# Patient Record
Sex: MALE | URBAN - METROPOLITAN AREA
[De-identification: names, ages, dates, MRNs, and addresses within clinical notes are randomized per-mention and may not be internally consistent; named-entity substitution may affect disease eponyms.]

---

## 2021-08-16 ENCOUNTER — OFFICE VISIT (OUTPATIENT)
Dept: URBAN - METROPOLITAN AREA CLINIC 64 | Facility: CLINIC | Age: 31
End: 2021-08-16

## 2021-08-16 PROCEDURE — 99203 OFFICE O/P NEW LOW 30 MIN: CPT | Performed by: OPTOMETRIST

## 2021-08-16 RX ORDER — CYCLOPENTOLATE HYDROCHLORIDE 10 MG/ML
1 % SOLUTION/ DROPS OPHTHALMIC
Qty: 1 | Refills: 0 | Status: INACTIVE
Start: 2021-08-16 | End: 2021-08-16

## 2021-08-16 RX ORDER — PREDNISOLONE ACETATE 10 MG/ML
1 % SUSPENSION/ DROPS OPHTHALMIC
Qty: 1 | Refills: 1 | Status: INACTIVE
Start: 2021-08-16 | End: 2021-08-27

## 2021-08-16 ASSESSMENT — INTRAOCULAR PRESSURE
OD: 26
OS: 16

## 2021-08-16 NOTE — IMPRESSION/PLAN
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 Plan: Post traumatic irits OD after blunt trauma with a toy spring. Start pred acetate q2h and 1% cyclo TID OD. RTC in 2-3 days for follow up. Slightly elevated IOP OD. Cataract is developing OD. Unable to view posterior pole due to severe photophobia.

## 2021-08-19 ENCOUNTER — OFFICE VISIT (OUTPATIENT)
Dept: URBAN - METROPOLITAN AREA CLINIC 64 | Facility: CLINIC | Age: 31
End: 2021-08-19

## 2021-08-19 PROCEDURE — 99212 OFFICE O/P EST SF 10 MIN: CPT | Performed by: OPTOMETRIST

## 2021-08-19 ASSESSMENT — INTRAOCULAR PRESSURE
OD: 40
OS: 18

## 2021-08-19 NOTE — IMPRESSION/PLAN
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 Plan: Post traumatic irits OD after blunt trauma with a toy spring. Improving. Continue pred acetate q2h and 1% cyclo TID OD. Add simbrinza sample TID OD. RTC in 1 wk.

## 2021-08-19 NOTE — IMPRESSION/PLAN
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 Plan: May need sx depending on [address] once iritis clears and dilation goes down.

## 2021-08-27 ENCOUNTER — OFFICE VISIT (OUTPATIENT)
Dept: URBAN - METROPOLITAN AREA CLINIC 64 | Facility: CLINIC | Age: 31
End: 2021-08-27

## 2021-08-27 PROCEDURE — 99212 OFFICE O/P EST SF 10 MIN: CPT | Performed by: OPTOMETRIST

## 2021-08-27 RX ORDER — DORZOLAMIDE HYDROCHLORIDE AND TIMOLOL MALEATE 20; 5 MG/ML; MG/ML
SOLUTION/ DROPS OPHTHALMIC
Qty: 1 | Refills: 1 | Status: INACTIVE
Start: 2021-08-27 | End: 2021-10-05

## 2021-08-27 RX ORDER — PREDNISOLONE ACETATE 10 MG/ML
1 % SUSPENSION/ DROPS OPHTHALMIC
Qty: 1 | Refills: 1 | Status: INACTIVE
Start: 2021-08-27 | End: 2021-10-19

## 2021-08-27 ASSESSMENT — INTRAOCULAR PRESSURE
OD: 32
OS: 17
OS: 18
OD: 38

## 2021-08-27 NOTE — IMPRESSION/PLAN
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 Plan: Significant on exam.  May need sx depending on [address] once iritis clears and dilation goes down.

## 2021-08-27 NOTE — IMPRESSION/PLAN
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 Plan: Post traumatic irits OD after blunt trauma with a toy spring. Improving. Taper pred acetate to qid OD and stop cyclo TID OD.

## 2021-08-27 NOTE — IMPRESSION/PLAN
Impression: Ocular hypertension, right eye: H40.051. Plan: He forgot to use his simbrinza sample today. It is in his girlfriends car and she left for the day. We'll switch to generic cosopt bid OD. RTC 1 wk.

## 2021-09-03 ENCOUNTER — OFFICE VISIT (OUTPATIENT)
Dept: URBAN - METROPOLITAN AREA CLINIC 64 | Facility: CLINIC | Age: 31
End: 2021-09-03

## 2021-09-03 DIAGNOSIS — H26.101 TRAUMATIC CATARACT OF RIGHT EYE: ICD-10-CM

## 2021-09-03 PROCEDURE — 99212 OFFICE O/P EST SF 10 MIN: CPT | Performed by: OPTOMETRIST

## 2021-09-03 ASSESSMENT — INTRAOCULAR PRESSURE
OS: 22
OD: 16

## 2021-09-03 NOTE — IMPRESSION/PLAN
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 Plan: Post traumatic irits OD after blunt trauma with a toy spring. Improving. Cont  pred acetate qid OD. RTC 2 wks. Hopefully we can stop drops if all normal by then.

## 2021-09-03 NOTE — IMPRESSION/PLAN
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 Plan: Significant on exam.  May need sx depending on [address] once iritis clears and dilation goes down.

## 2021-09-03 NOTE — IMPRESSION/PLAN
Impression: Ocular hypertension, right eye: H40.051. Plan: Good IOP OD today. Continue generic cosopt bid OD while on the steroids.

## 2021-09-21 ENCOUNTER — OFFICE VISIT (OUTPATIENT)
Dept: URBAN - METROPOLITAN AREA CLINIC 64 | Facility: CLINIC | Age: 31
End: 2021-09-21
Payer: COMMERCIAL

## 2021-09-21 PROCEDURE — 92012 INTRM OPH EXAM EST PATIENT: CPT | Performed by: OPTOMETRIST

## 2021-09-21 PROCEDURE — 92020 GONIOSCOPY: CPT | Performed by: OPTOMETRIST

## 2021-09-21 ASSESSMENT — INTRAOCULAR PRESSURE
OD: 40
OS: 20
OD: 35

## 2021-09-21 NOTE — IMPRESSION/PLAN
Impression: Ocular hypertension, right eye: H40.051. Plan: No significant angle recession seen on gonioscopy today. Suspect steroid response. Stop all drops and RTC for IOP check in 2 weeks.

## 2021-09-21 NOTE — IMPRESSION/PLAN
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 Plan: Significant on exam.  May need sx in the future. We'll see how he's doing once IOP stabilizes.

## 2021-09-21 NOTE — IMPRESSION/PLAN
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 Plan: Post traumatic irits OD after blunt trauma with a toy spring. Resolved. Stop drops.

## 2021-10-05 ENCOUNTER — OFFICE VISIT (OUTPATIENT)
Dept: URBAN - METROPOLITAN AREA CLINIC 64 | Facility: CLINIC | Age: 31
End: 2021-10-05
Payer: COMMERCIAL

## 2021-10-05 DIAGNOSIS — H20.041 SECONDARY NONINFECTIOUS IRIDOCYCLITIS OF RIGHT EYE: ICD-10-CM

## 2021-10-05 PROCEDURE — 92012 INTRM OPH EXAM EST PATIENT: CPT | Performed by: OPTOMETRIST

## 2021-10-05 PROCEDURE — 76514 ECHO EXAM OF EYE THICKNESS: CPT | Performed by: OPTOMETRIST

## 2021-10-05 RX ORDER — DORZOLAMIDE HYDROCHLORIDE AND TIMOLOL MALEATE 20; 5 MG/ML; MG/ML
SOLUTION/ DROPS OPHTHALMIC
Qty: 1 | Refills: 11 | Status: ACTIVE
Start: 2021-10-05

## 2021-10-05 ASSESSMENT — INTRAOCULAR PRESSURE
OD: 24
OD: 40
OS: 20

## 2021-10-05 NOTE — IMPRESSION/PLAN
Impression: Ocular hypertension, right eye: H40.051. Plan: Mild angle recession with moderate pigment 360 OD. Normal angle structure and no significant pigment OS. Elevated IOP OD today. Normal CCT OU. He is off the steroids at this time. Re-start generic cosopt bid OD. RTC for IOP check in 1 week.

## 2021-10-05 NOTE — IMPRESSION/PLAN
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 Plan: Significant on exam.  He feels like vision is improving so would like to wait on surgery for now.

## 2021-10-05 NOTE — IMPRESSION/PLAN
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 Plan: Post traumatic irits OD after blunt trauma with a toy spring. Resolved. He is off steroids at this time.

## 2021-10-19 ENCOUNTER — OFFICE VISIT (OUTPATIENT)
Dept: URBAN - METROPOLITAN AREA CLINIC 64 | Facility: CLINIC | Age: 31
End: 2021-10-19
Payer: COMMERCIAL

## 2021-10-19 DIAGNOSIS — H40.051 OCULAR HYPERTENSION, RIGHT EYE: Primary | ICD-10-CM

## 2021-10-19 PROCEDURE — 92012 INTRM OPH EXAM EST PATIENT: CPT | Performed by: OPTOMETRIST

## 2021-10-19 RX ORDER — LATANOPROST 50 UG/ML
0.005 % SOLUTION OPHTHALMIC
Qty: 1 | Refills: 11 | Status: INACTIVE
Start: 2021-10-19 | End: 2022-03-07

## 2021-10-19 ASSESSMENT — INTRAOCULAR PRESSURE
OS: 23
OD: 26
OS: 17

## 2021-10-19 NOTE — IMPRESSION/PLAN
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 Plan: Significant on exam.  He is ready to consider surgery. See above.

## 2021-10-19 NOTE — IMPRESSION/PLAN
Impression: Ocular hypertension, right eye: H40.051. Plan: Mild angle recession with moderate pigment 360 OD. Normal angle structure and no significant pigment OS. Elevated IOP reduced with generic cosopt bid OD. Normal CCT OU. Add latanoprost qhs OD to cosopt bid OD. RTC for complete exam / IOP / cataract eval in 2 weeks. He would like to consult with a surgeon about his cataract OD once his IOP gets under control. Consider OMNI 360 procedure.

## 2021-11-03 ENCOUNTER — OFFICE VISIT (OUTPATIENT)
Dept: URBAN - METROPOLITAN AREA CLINIC 64 | Facility: CLINIC | Age: 31
End: 2021-11-03
Payer: COMMERCIAL

## 2021-11-03 PROCEDURE — 92014 COMPRE OPH EXAM EST PT 1/>: CPT | Performed by: OPTOMETRIST

## 2021-11-03 ASSESSMENT — INTRAOCULAR PRESSURE
OS: 17
OD: 24
OD: 23
OD: 16
OS: 24

## 2021-11-03 ASSESSMENT — VISUAL ACUITY
OS: 20/20
OD: 20/25

## 2021-11-03 ASSESSMENT — KERATOMETRY: OD: 43.13

## 2021-11-03 NOTE — IMPRESSION/PLAN
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 Plan: Significant on exam.  Hx of recent blunt trauma that caused the cataract. He is ready to consider surgery. Recommend toric IOL. Pt. ed. on vision expectations with IOL compared with natural lens. Consider OMNI / Joanna Skelton.

## 2021-11-03 NOTE — IMPRESSION/PLAN
Impression: Ocular hypertension, right eye: H40.051. Plan: Mild angle recession with moderate pigment 360 OD. Normal angle structure and no significant pigment OS. Elevated IOP reduced with generic cosopt bid OD and latanoprost qhs OD. Consider OMNI / Joanna Banter with cataract surgery OD.

## 2021-11-24 ENCOUNTER — OFFICE VISIT (OUTPATIENT)
Dept: URBAN - METROPOLITAN AREA CLINIC 64 | Facility: CLINIC | Age: 31
End: 2021-11-24

## 2021-11-24 DIAGNOSIS — H52.223 REGULAR ASTIGMATISM, BILATERAL: Primary | ICD-10-CM

## 2021-11-24 ASSESSMENT — VISUAL ACUITY
OS: 20/20
OD: 20/20

## 2021-11-24 NOTE — IMPRESSION/PLAN
Impression: Ocular hypertension, right eye: H40.051. Plan: Mild angle recession with moderate pigment 360 OD. Normal angle structure and no significant pigment OS. Elevated IOP reduced with generic cosopt bid OD and latanoprost qhs OD. Continue same drops and RTC for IOP check in 3 months.

## 2021-11-24 NOTE — IMPRESSION/PLAN
Impression: Regular astigmatism, bilateral: H52.223. Plan: He would like to try new glasses instead of cataract surgery. Rx given.

## 2022-02-15 ENCOUNTER — OFFICE VISIT (OUTPATIENT)
Dept: URBAN - METROPOLITAN AREA CLINIC 64 | Facility: CLINIC | Age: 32
End: 2022-02-15
Payer: COMMERCIAL

## 2022-02-15 PROCEDURE — 92012 INTRM OPH EXAM EST PATIENT: CPT | Performed by: OPTOMETRIST

## 2022-02-15 ASSESSMENT — INTRAOCULAR PRESSURE
OS: 18
OD: 15
OS: 23
OD: 19

## 2022-02-15 NOTE — IMPRESSION/PLAN
Impression: Ocular hypertension, right eye: H40.051. Plan: Mild angle recession with moderate pigment 360 OD. Normal angle structure and no significant pigment OS. Elevated IOP reduced with generic cosopt bid OD and latanoprost qhs OD. Ok to stop cosopt. Continue latanoprost qhs OD. RTC for IOP check in 2-3 weeks.

## 2022-03-07 ENCOUNTER — OFFICE VISIT (OUTPATIENT)
Dept: URBAN - METROPOLITAN AREA CLINIC 64 | Facility: CLINIC | Age: 32
End: 2022-03-07
Payer: COMMERCIAL

## 2022-03-07 PROCEDURE — 92012 INTRM OPH EXAM EST PATIENT: CPT | Performed by: OPTOMETRIST

## 2022-03-07 RX ORDER — LATANOPROST 50 UG/ML
0.005 % SOLUTION OPHTHALMIC
Qty: 1 | Refills: 11 | Status: ACTIVE
Start: 2022-03-07

## 2022-03-07 ASSESSMENT — INTRAOCULAR PRESSURE
OD: 24
OS: 21
OS: 26
OD: 19

## 2022-03-07 NOTE — IMPRESSION/PLAN
Impression: Ocular hypertension, right eye: H40.051. Plan: Mild angle recession with moderate pigment 360 OD. Normal angle structure and no significant pigment OS. IOP controlled OD with latannoprost qhs OD. I was hoping to get off of it eventually but IOP remains just inside normal range today so we'll continue latanoprost qhs OD.   RTC 6 months complete exam.

## 2022-11-10 ENCOUNTER — OFFICE VISIT (OUTPATIENT)
Dept: URBAN - METROPOLITAN AREA CLINIC 64 | Facility: CLINIC | Age: 32
End: 2022-11-10
Payer: COMMERCIAL

## 2022-11-10 DIAGNOSIS — H26.101 TRAUMATIC CATARACT OF RIGHT EYE: ICD-10-CM

## 2022-11-10 DIAGNOSIS — H40.051 OCULAR HYPERTENSION, RIGHT EYE: Primary | ICD-10-CM

## 2022-11-10 DIAGNOSIS — H52.13 MYOPIA, BILATERAL: ICD-10-CM

## 2022-11-10 PROCEDURE — 92014 COMPRE OPH EXAM EST PT 1/>: CPT

## 2022-11-10 RX ORDER — LATANOPROST 50 UG/ML
0.005 % SOLUTION OPHTHALMIC
Qty: 2 | Refills: 11 | Status: ACTIVE
Start: 2022-11-10

## 2022-11-10 ASSESSMENT — INTRAOCULAR PRESSURE
OS: 23
OD: 16
OD: 19
OS: 16

## 2022-11-10 ASSESSMENT — KERATOMETRY: OS: 42.87

## 2022-11-10 ASSESSMENT — VISUAL ACUITY
OD: 20/20
OS: 20/20

## 2022-11-10 NOTE — IMPRESSION/PLAN
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 Plan: Secondary to blunt trauma. Significant on exam. BCVA 20/20 -2. Patient educated on condition. No significant decrease in vision and difficulty with activities of daily living. No surgery indicated at this time. Monitor Ocular HTN, consider MIGS at time of surgery.

## 2022-11-10 NOTE — IMPRESSION/PLAN
Impression: Ocular hypertension, right eye: H40.051. Plan: Mild angle recession. IOP controlled OD with latannoprost qhs OD. Continue latanoprost qhs OD. RTC 6 months HVF 24-2 and RNFL and IOP check.

## 2023-05-11 ENCOUNTER — OFFICE VISIT (OUTPATIENT)
Dept: URBAN - METROPOLITAN AREA CLINIC 64 | Facility: CLINIC | Age: 33
End: 2023-05-11
Payer: COMMERCIAL

## 2023-05-11 DIAGNOSIS — H40.051 OCULAR HYPERTENSION, RIGHT EYE: Primary | ICD-10-CM

## 2023-05-11 PROCEDURE — 92133 CPTRZD OPH DX IMG PST SGM ON: CPT

## 2023-05-11 PROCEDURE — 99213 OFFICE O/P EST LOW 20 MIN: CPT

## 2023-05-11 PROCEDURE — 92083 EXTENDED VISUAL FIELD XM: CPT

## 2023-05-11 RX ORDER — LATANOPROST 50 UG/ML
0.005 % SOLUTION OPHTHALMIC
Qty: 2 | Refills: 11 | Status: ACTIVE
Start: 2023-05-11

## 2023-05-11 ASSESSMENT — INTRAOCULAR PRESSURE
OS: 14
OD: 14

## 2023-05-11 NOTE — IMPRESSION/PLAN
Impression: Ocular hypertension, right eye: H40.051. IOP today: OD   , OS
IOP max: OD   , OS
HVF 24-2: 
               OD: overall decreased sensitivity OS: full OCT RNFL
              OD: normal
              OS: normal Plan: Mild angle recession OD, doing well with Latanoprost QHS OD. Continue drops. RTC 6 months for CE with Dr. Sergio Benavidez.

## 2024-08-13 ENCOUNTER — OFFICE VISIT (OUTPATIENT)
Dept: URBAN - METROPOLITAN AREA CLINIC 64 | Facility: LOCATION | Age: 34
End: 2024-08-13
Payer: COMMERCIAL

## 2024-08-13 DIAGNOSIS — H26.101 TRAUMATIC CATARACT OF RIGHT EYE: Primary | ICD-10-CM

## 2024-08-13 DIAGNOSIS — H40.051 OCULAR HYPERTENSION, RIGHT EYE: ICD-10-CM

## 2024-08-13 DIAGNOSIS — H52.13 MYOPIA, BILATERAL: ICD-10-CM

## 2024-08-13 PROCEDURE — 99214 OFFICE O/P EST MOD 30 MIN: CPT | Performed by: OPTOMETRIST

## 2024-08-13 ASSESSMENT — INTRAOCULAR PRESSURE
OD: 26
OS: 30
OD: 19
OS: 20

## 2024-08-13 ASSESSMENT — VISUAL ACUITY
OD: 20/20
OS: 20/20

## 2024-08-13 ASSESSMENT — KERATOMETRY
OS: 43.01
OD: 43.40

## 2024-09-03 ENCOUNTER — OFFICE VISIT (OUTPATIENT)
Dept: URBAN - METROPOLITAN AREA CLINIC 64 | Facility: LOCATION | Age: 34
End: 2024-09-03
Payer: COMMERCIAL

## 2024-09-03 DIAGNOSIS — H26.101 TRAUMATIC CATARACT OF RIGHT EYE: Primary | ICD-10-CM

## 2024-09-03 DIAGNOSIS — H40.051 OCULAR HYPERTENSION, RIGHT EYE: ICD-10-CM

## 2024-09-03 DIAGNOSIS — H52.223 REGULAR ASTIGMATISM, BILATERAL: ICD-10-CM

## 2024-09-03 PROCEDURE — 99204 OFFICE O/P NEW MOD 45 MIN: CPT | Performed by: STUDENT IN AN ORGANIZED HEALTH CARE EDUCATION/TRAINING PROGRAM

## 2024-09-03 ASSESSMENT — INTRAOCULAR PRESSURE
OS: 24
OD: 21

## 2024-09-18 ENCOUNTER — ADULT PHYSICAL (OUTPATIENT)
Dept: URBAN - METROPOLITAN AREA CLINIC 64 | Facility: LOCATION | Age: 34
End: 2024-09-18
Payer: COMMERCIAL

## 2024-09-18 DIAGNOSIS — H26.101 UNSPECIFIED TRAUMATIC CATARACT, RIGHT EYE: Primary | ICD-10-CM

## 2024-09-18 DIAGNOSIS — Z01.818 ENCOUNTER FOR OTHER PREPROCEDURAL EXAMINATION: Primary | ICD-10-CM

## 2024-09-18 PROCEDURE — 99203 OFFICE O/P NEW LOW 30 MIN: CPT | Performed by: NURSE PRACTITIONER
